# Patient Record
Sex: FEMALE | NOT HISPANIC OR LATINO | Employment: FULL TIME | URBAN - NONMETROPOLITAN AREA
[De-identification: names, ages, dates, MRNs, and addresses within clinical notes are randomized per-mention and may not be internally consistent; named-entity substitution may affect disease eponyms.]

---

## 2023-12-14 PROBLEM — J45.909 REACTIVE AIRWAY DISEASE (HHS-HCC): Status: ACTIVE | Noted: 2023-12-14

## 2023-12-14 PROBLEM — F41.9 ANXIETY: Status: ACTIVE | Noted: 2023-12-14

## 2023-12-14 PROBLEM — R10.2 PELVIC PAIN: Status: ACTIVE | Noted: 2023-12-14

## 2023-12-14 RX ORDER — TRIAMCINOLONE ACETONIDE 1 MG/G
1 CREAM TOPICAL EVERY 12 HOURS
COMMUNITY
Start: 2023-04-26

## 2023-12-14 RX ORDER — MONTELUKAST SODIUM 10 MG/1
TABLET ORAL EVERY 24 HOURS
COMMUNITY
Start: 2023-04-26

## 2023-12-14 RX ORDER — VIT C/E/ZN/COPPR/LUTEIN/ZEAXAN 250MG-90MG
1 CAPSULE ORAL EVERY 24 HOURS
COMMUNITY

## 2023-12-14 RX ORDER — ALBUTEROL SULFATE 90 UG/1
AEROSOL, METERED RESPIRATORY (INHALATION) EVERY 4 HOURS
COMMUNITY
Start: 2023-04-26

## 2023-12-14 RX ORDER — HYDROXYZINE HYDROCHLORIDE 25 MG/1
TABLET, FILM COATED ORAL EVERY 8 HOURS
COMMUNITY
Start: 2023-04-26

## 2023-12-14 RX ORDER — CHLORPHENIRAMINE MALEATE 4 MG
TABLET ORAL EVERY 6 HOURS
COMMUNITY
End: 2023-12-19 | Stop reason: ALTCHOICE

## 2023-12-14 RX ORDER — MELOXICAM 15 MG/1
1 TABLET ORAL DAILY PRN
COMMUNITY
Start: 2016-02-29 | End: 2023-12-19 | Stop reason: ALTCHOICE

## 2023-12-19 ENCOUNTER — OFFICE VISIT (OUTPATIENT)
Dept: PRIMARY CARE | Facility: CLINIC | Age: 48
End: 2023-12-19
Payer: COMMERCIAL

## 2023-12-19 VITALS
SYSTOLIC BLOOD PRESSURE: 136 MMHG | DIASTOLIC BLOOD PRESSURE: 74 MMHG | BODY MASS INDEX: 28.85 KG/M2 | HEIGHT: 68 IN | WEIGHT: 190.4 LBS | TEMPERATURE: 96 F | OXYGEN SATURATION: 99 % | HEART RATE: 114 BPM

## 2023-12-19 DIAGNOSIS — F41.9 ANXIETY: Primary | ICD-10-CM

## 2023-12-19 DIAGNOSIS — R73.03 PREDIABETES: ICD-10-CM

## 2023-12-19 DIAGNOSIS — J35.8 CALCULUS, TONSIL: ICD-10-CM

## 2023-12-19 PROBLEM — R10.2 PELVIC PAIN: Status: RESOLVED | Noted: 2023-12-14 | Resolved: 2023-12-19

## 2023-12-19 LAB — POC HEMOGLOBIN A1C: 5.7 % (ref 4.2–6.5)

## 2023-12-19 PROCEDURE — 1036F TOBACCO NON-USER: CPT | Performed by: NURSE PRACTITIONER

## 2023-12-19 PROCEDURE — 3008F BODY MASS INDEX DOCD: CPT | Performed by: NURSE PRACTITIONER

## 2023-12-19 PROCEDURE — 83036 HEMOGLOBIN GLYCOSYLATED A1C: CPT | Performed by: NURSE PRACTITIONER

## 2023-12-19 PROCEDURE — 99213 OFFICE O/P EST LOW 20 MIN: CPT | Performed by: NURSE PRACTITIONER

## 2023-12-19 ASSESSMENT — LIFESTYLE VARIABLES
SKIP TO QUESTIONS 9-10: 1
AUDIT-C TOTAL SCORE: 1
HOW OFTEN DO YOU HAVE SIX OR MORE DRINKS ON ONE OCCASION: NEVER
HOW OFTEN DURING THE LAST YEAR HAVE YOU FAILED TO DO WHAT WAS NORMALLY EXPECTED FROM YOU BECAUSE OF DRINKING: NEVER
AUDIT TOTAL SCORE: 1
HOW OFTEN DURING THE LAST YEAR HAVE YOU BEEN UNABLE TO REMEMBER WHAT HAPPENED THE NIGHT BEFORE BECAUSE YOU HAD BEEN DRINKING: NEVER
HOW OFTEN DURING THE LAST YEAR HAVE YOU FOUND THAT YOU WERE NOT ABLE TO STOP DRINKING ONCE YOU HAD STARTED: NEVER
HOW OFTEN DO YOU HAVE A DRINK CONTAINING ALCOHOL: MONTHLY OR LESS
HOW OFTEN DURING THE LAST YEAR HAVE YOU NEEDED AN ALCOHOLIC DRINK FIRST THING IN THE MORNING TO GET YOURSELF GOING AFTER A NIGHT OF HEAVY DRINKING: NEVER
HOW OFTEN DURING THE LAST YEAR HAVE YOU HAD A FEELING OF GUILT OR REMORSE AFTER DRINKING: NEVER
HAVE YOU OR SOMEONE ELSE BEEN INJURED AS A RESULT OF YOUR DRINKING: NO
HOW MANY STANDARD DRINKS CONTAINING ALCOHOL DO YOU HAVE ON A TYPICAL DAY: 1 OR 2
HAS A RELATIVE, FRIEND, DOCTOR, OR ANOTHER HEALTH PROFESSIONAL EXPRESSED CONCERN ABOUT YOUR DRINKING OR SUGGESTED YOU CUT DOWN: NO

## 2023-12-19 ASSESSMENT — ENCOUNTER SYMPTOMS
COUGH: 0
WHEEZING: 0
HEADACHES: 0
HEMATURIA: 0
JOINT SWELLING: 0
TREMORS: 0
ARTHRALGIAS: 0
PALPITATIONS: 0
BLOOD IN STOOL: 0
ACTIVITY CHANGE: 0
DIARRHEA: 0
BRUISES/BLEEDS EASILY: 0
ADENOPATHY: 0
AGITATION: 0
ABDOMINAL PAIN: 0
EYE DISCHARGE: 0
DEPRESSION: 0
SHORTNESS OF BREATH: 0
SINUS PAIN: 0
OCCASIONAL FEELINGS OF UNSTEADINESS: 0
CONSTIPATION: 0
NERVOUS/ANXIOUS: 0
BACK PAIN: 0
PHOTOPHOBIA: 0
SORE THROAT: 0
DIZZINESS: 0
APPETITE CHANGE: 0
LOSS OF SENSATION IN FEET: 0
WEAKNESS: 0
DYSURIA: 0

## 2023-12-19 ASSESSMENT — PAIN SCALES - GENERAL: PAINLEVEL: 0-NO PAIN

## 2023-12-19 ASSESSMENT — PATIENT HEALTH QUESTIONNAIRE - PHQ9
1. LITTLE INTEREST OR PLEASURE IN DOING THINGS: NOT AT ALL
10. IF YOU CHECKED OFF ANY PROBLEMS, HOW DIFFICULT HAVE THESE PROBLEMS MADE IT FOR YOU TO DO YOUR WORK, TAKE CARE OF THINGS AT HOME, OR GET ALONG WITH OTHER PEOPLE: NOT DIFFICULT AT ALL
2. FEELING DOWN, DEPRESSED OR HOPELESS: SEVERAL DAYS
SUM OF ALL RESPONSES TO PHQ9 QUESTIONS 1 AND 2: 1

## 2023-12-19 NOTE — PROGRESS NOTES
Subjective   Patient ID: Surekha George is a 48 y.o. female who presents for Follow-up.    Presents today for follow-up on prediabetes and intermittently elevated blood pressure.  She denies issue with her blood pressure at home.  She reports that typically it is in the 130/60 range.  She reports that she has some enlarged lymph nodes on the left side of her neck 1 below her jaw and 1 cervical.  She also notes that she does get tonsil stones frequently.  She would like a referral to ENT for further evaluation.  Notes she has been adjusting her diet to include more vegetables and less carbohydrates.  She is also increased her physical activity.  She notes her anxiety has been well-controlled.  She does note that she tends to have some more depressive symptoms during this time a year but feels her depression and anxiety are well-controlled without medication at this time.         Review of Systems   Constitutional:  Negative for activity change and appetite change.   HENT:  Negative for congestion, ear pain, hearing loss, sinus pain and sore throat.    Eyes:  Negative for photophobia, discharge and visual disturbance.   Respiratory:  Negative for cough, shortness of breath and wheezing.    Cardiovascular:  Negative for chest pain, palpitations and leg swelling.   Gastrointestinal:  Negative for abdominal pain, blood in stool, constipation and diarrhea.   Endocrine: Negative for cold intolerance, heat intolerance and polyuria.   Genitourinary:  Negative for dysuria and hematuria.   Musculoskeletal:  Negative for arthralgias, back pain and joint swelling.   Skin:  Negative for rash.   Allergic/Immunologic: Negative for environmental allergies and food allergies.   Neurological:  Negative for dizziness, tremors, syncope, weakness and headaches.   Hematological:  Negative for adenopathy. Does not bruise/bleed easily.   Psychiatric/Behavioral:  Negative for agitation and suicidal ideas. The patient is not  "nervous/anxious.        Objective   /74   Pulse (!) 114   Temp 35.6 °C (96 °F) (Temporal)   Ht 1.727 m (5' 8\")   Wt 86.4 kg (190 lb 6.4 oz)   SpO2 99%   BMI 28.95 kg/m²     Physical Exam  Constitutional:       General: She is not in acute distress.     Appearance: Normal appearance.   HENT:      Head: Normocephalic.      Right Ear: Tympanic membrane normal.      Left Ear: Tympanic membrane normal.      Nose: Nose normal.      Mouth/Throat:      Mouth: Mucous membranes are moist.   Eyes:      Conjunctiva/sclera: Conjunctivae normal.      Pupils: Pupils are equal, round, and reactive to light.   Cardiovascular:      Rate and Rhythm: Normal rate and regular rhythm.      Pulses: Normal pulses.      Heart sounds: Normal heart sounds.   Pulmonary:      Effort: Pulmonary effort is normal.      Breath sounds: Normal breath sounds.   Abdominal:      General: Bowel sounds are normal.      Palpations: Abdomen is soft.   Musculoskeletal:         General: Normal range of motion.   Skin:     General: Skin is warm and dry.      Capillary Refill: Capillary refill takes less than 2 seconds.   Neurological:      Mental Status: She is alert and oriented to person, place, and time.   Psychiatric:         Mood and Affect: Mood normal.         Speech: Speech normal.         Assessment/Plan   Problem List Items Addressed This Visit             ICD-10-CM    Anxiety - Primary F41.9     Other Visit Diagnoses         Codes    Prediabetes     R73.03    Relevant Orders    POCT glycosylated hemoglobin (Hb A1C) manually resulted (Completed)        Focus on healthy eating including lean proteins and vegetables.  Avoid high carbohydrate high sugar foods and drinks.  Try to increase physical activity as tolerated.  Goal is for 160 minutes/week of physical activity.  Check blood sugars 1-2 times per day.  Call for fasting blood sugars that are remaining greater than 150 or random blood sugars that are remaining greater than 180.  Check " blood pressure 2-3 times a week.  Call for blood pressures greater than 140/90.  Bring list of blood pressures to next visit.

## 2023-12-19 NOTE — PATIENT INSTRUCTIONS
Focus on healthy eating including lean proteins and vegetables.  Avoid high carbohydrate high sugar foods and drinks.  Try to increase physical activity as tolerated.  Goal is for 160 minutes/week of physical activity.  Check blood sugars 1-2 times per day.  Call for fasting blood sugars that are remaining greater than 150 or random blood sugars that are remaining greater than 180.  Check blood pressure 2-3 times a week.  Call for blood pressures greater than 140/90.  Bring list of blood pressures to next visit.    
[facility]  Scheduled Appointment: 08/16/2022    [facility]  Scheduled Appointment: 08/30/2022    [facility]  Scheduled Appointment: 09/13/2022    Maria Teresa Jeffery  [facility]  [facility]  Scheduled Appointment: 09/14/2022    [facility]  Scheduled Appointment: 09/27/2022    [facility]  Scheduled Appointment: 10/11/2022    [facility]  Scheduled Appointment: 10/25/2022

## 2024-06-18 ENCOUNTER — APPOINTMENT (OUTPATIENT)
Dept: PRIMARY CARE | Facility: CLINIC | Age: 49
End: 2024-06-18
Payer: COMMERCIAL